# Patient Record
Sex: FEMALE | Race: ASIAN | NOT HISPANIC OR LATINO | URBAN - METROPOLITAN AREA
[De-identification: names, ages, dates, MRNs, and addresses within clinical notes are randomized per-mention and may not be internally consistent; named-entity substitution may affect disease eponyms.]

---

## 2021-07-23 ENCOUNTER — APPOINTMENT (OUTPATIENT)
Dept: RADIOLOGY | Facility: IMAGING CENTER | Age: 21
End: 2021-07-23
Attending: PHYSICIAN ASSISTANT
Payer: OTHER MISCELLANEOUS

## 2021-07-23 ENCOUNTER — OFFICE VISIT (OUTPATIENT)
Dept: URGENT CARE | Facility: CLINIC | Age: 21
End: 2021-07-23
Payer: OTHER MISCELLANEOUS

## 2021-07-23 VITALS
SYSTOLIC BLOOD PRESSURE: 102 MMHG | WEIGHT: 138.8 LBS | DIASTOLIC BLOOD PRESSURE: 60 MMHG | OXYGEN SATURATION: 97 % | RESPIRATION RATE: 16 BRPM | HEIGHT: 63 IN | BODY MASS INDEX: 24.59 KG/M2 | TEMPERATURE: 97.2 F | HEART RATE: 88 BPM

## 2021-07-23 DIAGNOSIS — M53.3 SACRAL PAIN: ICD-10-CM

## 2021-07-23 DIAGNOSIS — M54.32 SCIATICA OF LEFT SIDE: ICD-10-CM
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
## 2021-07-23 PROCEDURE — 99204 OFFICE O/P NEW MOD 45 MIN: CPT | Performed by: PHYSICIAN ASSISTANT

## 2021-07-23 PROCEDURE — 72220 X-RAY EXAM SACRUM TAILBONE: CPT | Mod: TC | Performed by: PHYSICIAN ASSISTANT

## 2021-07-23 RX ORDER — TIZANIDINE 4 MG/1
4 TABLET ORAL NIGHTLY PRN
Qty: 7 TABLET | Refills: 0 | Status: SHIPPED | OUTPATIENT
Start: 2021-07-23 | End: 2021-07-30

## 2021-07-23 RX ORDER — PREDNISONE 10 MG/1
40 TABLET ORAL DAILY
Qty: 20 TABLET | Refills: 0 | Status: SHIPPED | OUTPATIENT
Start: 2021-07-23 | End: 2021-07-28

## 2021-07-23 ASSESSMENT — ENCOUNTER SYMPTOMS
FALLS: 1
BACK PAIN: 1

## 2021-07-23 NOTE — PROGRESS NOTES
"Subjective:   Nida Blair is a 21 y.o. female who presents for Leg Injury (x2 wks ago, fell on bus on the stairs. Shes having pain when she lies down or rolls over in (L) leg, cannot run)        Patient presents for evaluation of sacral pain that radiates down the left buttock and to the left ankle that began 2 weeks ago.  States that she was not holding onto anything when the public transit bus started to drive.  She fell backwards onto the steps and landed directly on her buttocks.  Since then she has been experiencing pain in the aforementioned areas.  Her pain is worse with housekeeping and walking.  She is not taking any medications for symptoms.  Denies prior similar symptoms.  Denies loss of bowel or bladder control, lower extremity weakness, saddle dysesthesias.      Review of Systems   Musculoskeletal: Positive for back pain and falls.       PMH:  has no past medical history on file.  MEDS:   Current Outpatient Medications:   •  predniSONE (DELTASONE) 10 MG Tab, Take 4 Tablets by mouth every day for 5 days., Disp: 20 tablet, Rfl: 0  •  tizanidine (ZANAFLEX) 4 MG Tab, Take 1 tablet by mouth at bedtime as needed for up to 7 days., Disp: 7 tablet, Rfl: 0  ALLERGIES: No Known Allergies  SURGHX: History reviewed. No pertinent surgical history.  SOCHX:  reports that she has never smoked. She has never used smokeless tobacco. She reports current alcohol use. She reports that she does not use drugs.  FH: Family history was reviewed, no pertinent findings to report   Objective:   /60 (BP Location: Left arm, Patient Position: Sitting, BP Cuff Size: Adult long)   Pulse 88   Temp 36.2 °C (97.2 °F) (Temporal)   Resp 16   Ht 1.6 m (5' 3\")   Wt 63 kg (138 lb 12.8 oz)   LMP 07/23/2021 (Exact Date)   SpO2 97%   Breastfeeding No   BMI 24.59 kg/m²   Physical Exam  Vitals reviewed.   Constitutional:       General: She is not in acute distress.     Appearance: Normal appearance. She is well-developed. She " is not toxic-appearing.   HENT:      Head: Normocephalic and atraumatic.      Right Ear: External ear normal.      Left Ear: External ear normal.      Nose: Nose normal.   Eyes:      General: Gaze aligned appropriately.   Cardiovascular:      Rate and Rhythm: Normal rate and regular rhythm.   Pulmonary:      Effort: Pulmonary effort is normal. No respiratory distress.      Breath sounds: No stridor.   Musculoskeletal:      Cervical back: Neck supple.      Comments: Back:  General: No asymmetry, bruising, or erythema appreciated  ROM: flexion 70°, extension 10°, lateral bend 10°  Palpation: TTP over superior sacrum, left SI joint and left glute med. No tenderness to palpation of spinous processes, no step-offs appreciated, no tenderness to palpation of paraspinal muscles.  Strength: 5/5 hip flexion/extension  Special tests: Straight leg raise + left   Neuro: Sensation intact and equal bilaterally in LE's  Stands on heels and toes with out impediment.   Skin:     General: Skin is warm and dry.      Capillary Refill: Capillary refill takes less than 2 seconds.   Neurological:      Mental Status: She is alert and oriented to person, place, and time.      Comments: CN2-12 grossly intact   Psychiatric:         Speech: Speech normal.         Behavior: Behavior normal.              TECHNIQUE/EXAM DESCRIPTION AND NUMBER OF VIEWS:  3 views of the sacrum and coccyx.     COMPARISON: None.     FINDINGS:  There are no fractures or dislocations.  No blastic or lytic lesions.  The sacral neural arches are intact.     IMPRESSION:     Negative sacrum and coccyx exam.  Assessment/Plan:   1. Fall, initial encounter    2. Sacral pain  - DX-SACRUM AND COCCYX 2+; Future  - predniSONE (DELTASONE) 10 MG Tab; Take 4 Tablets by mouth every day for 5 days.  Dispense: 20 tablet; Refill: 0  - tizanidine (ZANAFLEX) 4 MG Tab; Take 1 tablet by mouth at bedtime as needed for up to 7 days.  Dispense: 7 tablet; Refill: 0    3. Sciatica of left side  -  predniSONE (DELTASONE) 10 MG Tab; Take 4 Tablets by mouth every day for 5 days.  Dispense: 20 tablet; Refill: 0  - tizanidine (ZANAFLEX) 4 MG Tab; Take 1 tablet by mouth at bedtime as needed for up to 7 days.  Dispense: 7 tablet; Refill: 0    A language line Idris  was used for the duration of this visit.    No acute bony abnormalities noted on x-ray.  Sciatica could be secondary to herniated disc or muscle strain.  Patient started on short burst of prednisone and Zanaflex at nighttime as needed.  She was advised that this medication is sedating and should not be taken during the day or with other sedating medications.  Red flag symptoms reviewed, she develops these-to ED.  Return to clinic for reevaluation with any new or worsening symptoms.    Differential diagnosis, natural history, supportive care, and indications for immediate follow-up discussed.

## 2021-07-23 NOTE — LETTER
July 23, 2021    To Whom It May Concern:         This is confirmation that Nida Negronstephiegifty attended her scheduled appointment with Gene Rodríguez P.A.-C. on 7/23/21. Please excuse her from work on 7/24-7/26/21.         If you have any questions please do not hesitate to call me at the phone number listed below.    Sincerely,          Gene Rodríguez P.A.-C.  [phone]